# Patient Record
(demographics unavailable — no encounter records)

---

## 2025-02-06 NOTE — HEALTH RISK ASSESSMENT
[No falls in past year] : Patient reported no falls in the past year [0] : 2) Feeling down, depressed, or hopeless: Not at all (0) [PHQ-2 Negative - No further assessment needed] : PHQ-2 Negative - No further assessment needed [Never] : Never [NO] : No [With Family] : lives with family [] :  [Fully functional (bathing, dressing, toileting, transferring, walking, feeding)] : Fully functional (bathing, dressing, toileting, transferring, walking, feeding) [Fully functional (using the telephone, shopping, preparing meals, housekeeping, doing laundry, using] : Fully functional and needs no help or supervision to perform IADLs (using the telephone, shopping, preparing meals, housekeeping, doing laundry, using transportation, managing medications and managing finances) [Smoke Detector] : smoke detector [Carbon Monoxide Detector] : carbon monoxide detector [Seat Belt] :  uses seat belt [Sunscreen] : uses sunscreen [With Patient/Caregiver] : , with patient/caregiver [Patient reported colonoscopy was normal] : Patient reported colonoscopy was normal [de-identified] : exercsing reg treadmill [de-identified] : ramone pt [[id number]] : 0 [EyeExamDate] : 1/1/23 [Change in mental status noted] : No change in mental status noted [Reports changes in hearing] : Reports no changes in hearing [ColonoscopyDate] : 8/23/ [AdvancecareDate] : 2/6/25

## 2025-02-06 NOTE — ASSESSMENT
[FreeTextEntry1] : flu shot today.  Will go the lab when fasting for blood work. All preventative measures were reviewed with the patient and the patient is due for and agrees to the following as outlined  in the plan  below.

## 2025-02-06 NOTE — PHYSICAL EXAM
[No Acute Distress] : no acute distress [Well Nourished] : well nourished [Well Developed] : well developed [Well-Appearing] : well-appearing [Normal Sclera/Conjunctiva] : normal sclera/conjunctiva [PERRL] : pupils equal round and reactive to light [EOMI] : extraocular movements intact [Normal Outer Ear/Nose] : the outer ears and nose were normal in appearance [Normal Oropharynx] : the oropharynx was normal [No JVD] : no jugular venous distention [No Lymphadenopathy] : no lymphadenopathy [Supple] : supple [Thyroid Normal, No Nodules] : the thyroid was normal and there were no nodules present [No Respiratory Distress] : no respiratory distress  [No Accessory Muscle Use] : no accessory muscle use [Clear to Auscultation] : lungs were clear to auscultation bilaterally [Normal Rate] : normal rate  [Regular Rhythm] : with a regular rhythm [Normal S1, S2] : normal S1 and S2 [No Murmur] : no murmur heard [No Carotid Bruits] : no carotid bruits [No Abdominal Bruit] : a ~M bruit was not heard ~T in the abdomen [No Varicosities] : no varicosities [Pedal Pulses Present] : the pedal pulses are present [No Edema] : there was no peripheral edema [No Palpable Aorta] : no palpable aorta [No Extremity Clubbing/Cyanosis] : no extremity clubbing/cyanosis [Soft] : abdomen soft [Non Tender] : non-tender [Non-distended] : non-distended [No Masses] : no abdominal mass palpated [No HSM] : no HSM [Normal Bowel Sounds] : normal bowel sounds [Normal Posterior Cervical Nodes] : no posterior cervical lymphadenopathy [Normal Anterior Cervical Nodes] : no anterior cervical lymphadenopathy [No CVA Tenderness] : no CVA  tenderness [No Spinal Tenderness] : no spinal tenderness [No Joint Swelling] : no joint swelling [Grossly Normal Strength/Tone] : grossly normal strength/tone [No Rash] : no rash [Coordination Grossly Intact] : coordination grossly intact [No Focal Deficits] : no focal deficits [Normal Gait] : normal gait [Deep Tendon Reflexes (DTR)] : deep tendon reflexes were 2+ and symmetric [Normal Affect] : the affect was normal [Normal Insight/Judgement] : insight and judgment were intact [No Mass] : no mass [Stool Occult Blood] : stool negative for occult blood [Anus Abnormality] : the anus and perineum were normal [Rectal Exam - Rectum] : digital rectal exam was normal [Prostate Enlargement] : the prostate was not enlarged [Prostate Tenderness] : the prostate was not tender [No Prostate Nodules] : no prostate nodules

## 2025-02-06 NOTE — HEALTH RISK ASSESSMENT
[No falls in past year] : Patient reported no falls in the past year [0] : 2) Feeling down, depressed, or hopeless: Not at all (0) [PHQ-2 Negative - No further assessment needed] : PHQ-2 Negative - No further assessment needed [Never] : Never [NO] : No [With Family] : lives with family [] :  [Fully functional (bathing, dressing, toileting, transferring, walking, feeding)] : Fully functional (bathing, dressing, toileting, transferring, walking, feeding) [Fully functional (using the telephone, shopping, preparing meals, housekeeping, doing laundry, using] : Fully functional and needs no help or supervision to perform IADLs (using the telephone, shopping, preparing meals, housekeeping, doing laundry, using transportation, managing medications and managing finances) [Smoke Detector] : smoke detector [Carbon Monoxide Detector] : carbon monoxide detector [Seat Belt] :  uses seat belt [Sunscreen] : uses sunscreen [With Patient/Caregiver] : , with patient/caregiver [Patient reported colonoscopy was normal] : Patient reported colonoscopy was normal [de-identified] : exercsing reg treadmill [de-identified] : ramone pt [[id number]] : 0 [EyeExamDate] : 1/1/23 [Change in mental status noted] : No change in mental status noted [Reports changes in hearing] : Reports no changes in hearing [ColonoscopyDate] : 8/23/ [AdvancecareDate] : 2/6/25

## 2025-02-06 NOTE — HEALTH RISK ASSESSMENT
[No falls in past year] : Patient reported no falls in the past year [0] : 2) Feeling down, depressed, or hopeless: Not at all (0) [PHQ-2 Negative - No further assessment needed] : PHQ-2 Negative - No further assessment needed [Never] : Never [NO] : No [With Family] : lives with family [] :  [Fully functional (bathing, dressing, toileting, transferring, walking, feeding)] : Fully functional (bathing, dressing, toileting, transferring, walking, feeding) [Fully functional (using the telephone, shopping, preparing meals, housekeeping, doing laundry, using] : Fully functional and needs no help or supervision to perform IADLs (using the telephone, shopping, preparing meals, housekeeping, doing laundry, using transportation, managing medications and managing finances) [Smoke Detector] : smoke detector [Carbon Monoxide Detector] : carbon monoxide detector [Seat Belt] :  uses seat belt [Sunscreen] : uses sunscreen [With Patient/Caregiver] : , with patient/caregiver [Patient reported colonoscopy was normal] : Patient reported colonoscopy was normal [de-identified] : exercsing reg treadmill [de-identified] : ramone pt [[id number]] : 0 [EyeExamDate] : 1/1/23 [Change in mental status noted] : No change in mental status noted [Reports changes in hearing] : Reports no changes in hearing [ColonoscopyDate] : 8/23/ [AdvancecareDate] : 2/6/25

## 2025-04-16 NOTE — HISTORY OF PRESENT ILLNESS
[de-identified] : Pt. presents for skin check; c/o few spots of concern;  new growth under L eye Severity:  mild   Modifying factors:  none Associated symptoms:  none Context:  no association with activity

## 2025-04-16 NOTE — PHYSICAL EXAM
[Full Body Skin Exam Performed] : performed [FreeTextEntry3] : Skin examination performed of the face, neck, trunk, arms, legs;  The patient is well, alert and oriented, pleasant and cooperative. Eyelids, conjunctivae, oral mucosa, digits and nails all normal.   No cervical adenopathy.  Normal findings include:  Seborrheic keratoses- small SK left lower eyelid Angiomas + lentigines and solar damage are present in sun exposed areas;  Multiple benign nevi were noted. trunk, flanks  No lesions were suspicious for malignancy.

## 2025-04-16 NOTE — ASSESSMENT
[FreeTextEntry1] : Complete skin examination is negative for malignancy; Multiple new concerns were addressed and discussed. Therapeutic options and their risks and benefits; along with multiple diagnostic possibilities were discussed at length; risks and benefits of skin biopsy and/or other further study were discussed;   The patient has a history of significant sun exposure.  Fishing Continue annual exams.   SK left lower eyelid f/u for cosmetic D&D cosmetic @ [facility] The risks and benefits of the procedure, were explained at length including pain; scar, bleeding; recurrence of lesion(s); need for wound care;